# Patient Record
Sex: MALE | Race: WHITE
[De-identification: names, ages, dates, MRNs, and addresses within clinical notes are randomized per-mention and may not be internally consistent; named-entity substitution may affect disease eponyms.]

---

## 2019-03-01 ENCOUNTER — HOSPITAL ENCOUNTER (OUTPATIENT)
Dept: HOSPITAL 95 - ORSCMMR | Age: 69
Discharge: HOME | End: 2019-03-01
Attending: SURGERY
Payer: MEDICARE

## 2019-03-01 VITALS — BODY MASS INDEX: 22.22 KG/M2 | WEIGHT: 150 LBS | HEIGHT: 69 IN

## 2019-03-01 DIAGNOSIS — G62.9: ICD-10-CM

## 2019-03-01 DIAGNOSIS — E03.9: ICD-10-CM

## 2019-03-01 DIAGNOSIS — Z79.899: ICD-10-CM

## 2019-03-01 DIAGNOSIS — K21.9: ICD-10-CM

## 2019-03-01 DIAGNOSIS — J45.909: ICD-10-CM

## 2019-03-01 DIAGNOSIS — K40.20: Primary | ICD-10-CM

## 2019-03-01 PROCEDURE — 8E0W4CZ ROBOTIC ASSISTED PROCEDURE OF TRUNK REGION, PERCUTANEOUS ENDOSCOPIC APPROACH: ICD-10-PCS | Performed by: SURGERY

## 2019-03-01 PROCEDURE — 49650 LAP ING HERNIA REPAIR INIT: CPT

## 2019-03-01 PROCEDURE — 0YUA4JZ SUPPLEMENT BILATERAL INGUINAL REGION WITH SYNTHETIC SUBSTITUTE, PERCUTANEOUS ENDOSCOPIC APPROACH: ICD-10-PCS | Performed by: SURGERY

## 2019-03-01 PROCEDURE — C1781 MESH (IMPLANTABLE): HCPCS

## 2019-03-01 NOTE — NUR
ASSUMED CARE OF PT FROM ELOISE SPENCER - PT AMB BACK TO [facility] FROM BATHROOM
AFTER ATTEMPTING TO VOID WITHOUT SUCCESS.

## 2019-03-01 NOTE — NUR
PT PRESENTS WITH GLUE TO INCISIONAL SITES, D&I NO DRAINAGE.  PT DRINKING
FLUIDS AND HAS EAT CRACKERS.  TOLERATED WELL SO FAR.  REVIEWED DISCHARGE
INSTRUCTIONS WITH PT.  ICE TO ABLE.  PTS WIFE AT BEDSIDE.  PT INSTRUCTED THAT
HE NEEDS TO VOID PRIOR TO DISCHARGE.

## 2019-03-01 NOTE — NUR
PT UNABLE TO VOIDED.  STATES HE DOESN'T FEEL THE URGE.  WIFE GIVEN RX TO TAKE
TO [facility] TO HAVE FILLED.

## 2019-03-01 NOTE — NUR
DR. MATHEWS AT BEDSIDE.  DISCUSS WITH PT THAT HE WOULD LIKE HIM TO VOID BEFORE
HE LEAVES.  STATES THAT HIS BLADDER WAS FULL AND A CATHETAR WAS NEEDED DURING
SURGERY.  INSTRUCTED PT THAT IF A CATHETER MAY BE NEEDED IF PT IS UNABLE TO
VOID AND THAT HE WOULD SEE HIM IN THE OFFICE ON MONDAY TO REMOVE THE CATH IF
NEEDED.  PT VERBALIZED UNDERSTANDING

## 2019-03-01 NOTE — NUR
BLADDER SCAN COMPLETED REVEALING 779ML.  PT STATES HE DOESN'T HAVE THE URGE TO
VOID.  16F TORRES CATH PLACED.  PT TOLERATED WELL.  ATTACHED TO A TORRES BAG TO
GRAVITY.

## 2019-03-21 ENCOUNTER — HOSPITAL ENCOUNTER (OUTPATIENT)
Dept: HOSPITAL 95 - LAB SHORT | Age: 69
Discharge: HOME | End: 2019-03-21
Attending: SURGERY
Payer: MEDICARE

## 2019-03-21 DIAGNOSIS — N40.1: Primary | ICD-10-CM

## 2019-03-21 LAB
LEUKOCYTE ESTERASE UR QL STRIP: (no result)
SP GR SPEC: 1.01 (ref 1–1.02)
UROBILINOGEN UR STRIP-MCNC: (no result) MG/DL
WBC #/AREA URNS HPF: (no result) /HPF (ref 0–5)

## 2023-02-27 ENCOUNTER — HOSPITAL ENCOUNTER (OUTPATIENT)
Dept: HOSPITAL 95 - ORSCSDS | Age: 73
Discharge: HOME | End: 2023-02-27
Attending: INTERNAL MEDICINE
Payer: MEDICARE

## 2023-02-27 VITALS — HEIGHT: 69 IN | BODY MASS INDEX: 22.01 KG/M2 | WEIGHT: 148.59 LBS

## 2023-02-27 DIAGNOSIS — K57.30: ICD-10-CM

## 2023-02-27 DIAGNOSIS — Z79.899: ICD-10-CM

## 2023-02-27 DIAGNOSIS — Z12.11: Primary | ICD-10-CM

## 2023-02-27 DIAGNOSIS — Z86.010: ICD-10-CM

## 2023-02-27 PROCEDURE — 0DJD8ZZ INSPECTION OF LOWER INTESTINAL TRACT, VIA NATURAL OR ARTIFICIAL OPENING ENDOSCOPIC: ICD-10-PCS | Performed by: INTERNAL MEDICINE
